# Patient Record
Sex: MALE | Race: WHITE
[De-identification: names, ages, dates, MRNs, and addresses within clinical notes are randomized per-mention and may not be internally consistent; named-entity substitution may affect disease eponyms.]

---

## 2019-10-31 ENCOUNTER — HOSPITAL ENCOUNTER (EMERGENCY)
Dept: HOSPITAL 76 - ED | Age: 64
Discharge: HOME | End: 2019-10-31
Payer: COMMERCIAL

## 2019-10-31 VITALS — SYSTOLIC BLOOD PRESSURE: 124 MMHG | DIASTOLIC BLOOD PRESSURE: 80 MMHG

## 2019-10-31 DIAGNOSIS — S52.572A: Primary | ICD-10-CM
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
## 2019-10-31 DIAGNOSIS — F17.200: ICD-10-CM

## 2019-10-31 PROCEDURE — 99283 EMERGENCY DEPT VISIT LOW MDM: CPT

## 2019-10-31 PROCEDURE — 99282 EMERGENCY DEPT VISIT SF MDM: CPT

## 2019-10-31 NOTE — ED PHYSICIAN DOCUMENTATION
PD HPI UPPER EXT INJURY





- Stated complaint


Stated Complaint: GLF/HAND INJURY





- Chief complaint


Chief Complaint: Trauma Ext





- History obtained from


History obtained from: Patient





- History of Present Illness


Location: Left, Wrist


Type of injury: Fall


Where injury occurred: Work


Timing - onset: Today


Timing - duration: Hours


Timing - details: Abrupt onset


Improved by: Rest, Immobilization


Worsened by: Moving, Palpating


Associated symptoms: No: Weakness, Numbness, Tingling, Swelling


Similar symptoms before: Has not had sx before


Recently seen: Not recently seen





- Additonal information


Additional information: 





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 

his van walking up to the dumpster when he slipped and fell onto his 

outstretched left hand behind him.  He complains of pain in the left wrist 

radiating up his forearm to the elbow.  He has never broken a bone previously.  

He was not ill prior to this.





Review of Systems


Constitutional: denies: Fever


Eyes: denies: Decreased vision


Ears: denies: Ear pain


Nose: denies: Congestion


Respiratory: denies: Cough


GI: denies: Vomiting





PD PAST MEDICAL HISTORY





- Past Surgical History


General: Appendectomy





- Present Medications


Home Medications: 


                                Ambulatory Orders











 Medication  Instructions  Recorded  Confirmed


 


Buprenorphine HCl/Naloxone HCl  10/31/19 





[Suboxone 8-2 mg Sl tab]   














- Allergies


Allergies/Adverse Reactions: 


                                    Allergies











Allergy/AdvReac Type Severity Reaction Status Date / Time


 


codeine Allergy  Rash Verified 10/31/19 09:17














- Social History


Does the pt smoke?: Yes


Smoking Status: Current every day smoker


Does the pt drink ETOH?: No


Does the pt have substance abuse?: No





PD ED PE NORMAL





- Vitals


Vital signs reviewed: Yes (normal )





- General


General: Alert and oriented X 3, No acute distress, Well developed/nourished





- HEENT


HEENT: Atraumatic, PERRL, EOMI





- Respiratory


Respiratory: No respiratory distress





- Derm


Derm: Normal color, Warm and dry, No rash





- Extremities


Extremities: No deformity, Other (There is tendernes to the distal radius 

without restriction in ROM of the wrist. There is pain with ROM and the patient 

has pain with trying to clench his fist. There is pain radiating over the dorsum

of the forearm to the brachioradialis and specifically there is no bony 

tenderness to the radial head or the olecrenon and there is normal 

suppination/pronation and flexion/extention)





Results





- Vitals


Vitals: 


                               Vital Signs - 24 hr











  10/31/19





  09:14


 


Temperature 36.8 C


 


Heart Rate 84


 


Respiratory 18





Rate 


 


Blood Pressure 124/80


 


O2 Saturation 96








                                     Oxygen











O2 Source                      Room air

















- Rads (name of study)


  ** forearm


Radiology: Prelim report reviewed (Impression: Minimally displaced fracture of 

the distal radius which extends to the articular surface.), EMP read 

indepedently, See rad report





  ** hand


Radiology: Prelim report reviewed (Impression: 1.  Oblique lucency through the 

distal radius extending to the articular surface compatible with nondisplaced 

fracture.  Dedicated wrist radiographs could be considered if there is clinical 

question.  Other findings as above.), EMP read indepedently, See rad report





PD MEDICAL DECISION MAKING





- ED course


Complexity details: reviewed results, re-evaluated patient, considered 

differential, d/w patient


ED course: 





68-year-old male with a FOOSH to the left forearm has a distal radius fracture 

that does involve the articular surface but with minimal displacement.  He is 

placed into a volar splint and instructed to follow-up with orthopedics.  He 

does live in [place] and he may seek care at [facility].  He is given 

contact information for orthopedics here.





Departure





- Departure


Disposition: 01 Home, Self Care


Clinical Impression: 


Distal radius fracture, left


Qualifiers:


 Encounter type: initial encounter Fracture type: closed Fracture morphology: 

other intra-articular Qualified Code(s): S52.572A - Other intraarticular 

fracture of lower end of left radius, initial encounter for closed fracture





Condition: Stable


Instructions:  ED Fx Forearm Radius Ulna No Redu Requ


Follow-Up: 


[facility] [Provider Group]


Forms:  Activity restrictions

## 2019-10-31 NOTE — XRAY REPORT
Reason:  L hand pain

Procedure Date:  10/31/2019   

Accession Number:  [id number] / [id number]                    

Procedure:  XR  - Hand 3 View LT CPT Code:  

 

FULL RESULT:

 

 

EXAM:

LEFT HAND RADIOGRAPHY

 

EXAM DATE: 10/31/2019 09:21 AM.

 

CLINICAL HISTORY: L hand pain. Follow on outstretched hand.

 

COMPARISON: None.

 

TECHNIQUE: 3 views.

 

FINDINGS:

Bones: Oblique lucency through the distal radial epiphysis extending to 

the articular surface on oblique view suggestive of nondisplaced fracture.

 

Small osseous density anterior to the base of the third digit middle 

phalanx may be related to old trauma. Similar punctate osseous density at 

the lateral aspect of the thumb distal phalanx base may also be related 

to old trauma.

 

Mild degenerative changes.

 

Joints: No subluxations.

 

Soft Tissues: Suspected soft tissue swelling about the wrist.

IMPRESSION:

1. Oblique lucency through distal radius extending to the articular 

surface compatible with nondisplaced fracture. Dedicated wrist 

radiographs could be considered if there is clinical question.

2. Other findings as above.

 

RADIA

## 2019-10-31 NOTE — XRAY REPORT
Reason:  FOOSH dorsal pain

Procedure Date:  10/31/2019   

Accession Number:  [id number] / [id number]                    

Procedure:  XR  - Forearm LT CPT Code:  

 

FULL RESULT:

 

 

EXAM:

LEFT FOREARM RADIOGRAPHY

 

EXAM DATE: 10/31/2019 09:24 AM.

 

CLINICAL HISTORY: FOOSH dorsal pain.

 

COMPARISON: HAND 3 VIEW LT 10/31/2019 9:21 AM.

 

TECHNIQUE: 2 views.

 

FINDINGS:

Bones: Oblique lucency through the distal radial epiphysis extending to 

the articular surface compatible with fracture. There appears to be 2 mm 

of displacement at the volar aspect of the fracture line on lateral view. 

No other acute fracture is suspected. Small well-corticated osseous 

density adjacent to the distal humerus lateral condyle may be related to 

old trauma.

 

Joints: No obvious subluxations seen on limited visualization of the 

wrist and elbow.

 

Soft Tissues: Soft tissue swelling may be present about the wrist.

IMPRESSION:

1. Minimally displaced fracture of the distal radius which extends to the 

articular surface.

 

RADIA